# Patient Record
Sex: FEMALE | Race: WHITE
[De-identification: names, ages, dates, MRNs, and addresses within clinical notes are randomized per-mention and may not be internally consistent; named-entity substitution may affect disease eponyms.]

---

## 2021-01-20 ENCOUNTER — HOSPITAL ENCOUNTER (OUTPATIENT)
Dept: HOSPITAL 47 - PNWHC3 | Age: 66
Discharge: HOME | End: 2021-01-20
Attending: ANESTHESIOLOGY
Payer: MEDICARE

## 2021-01-20 VITALS
RESPIRATION RATE: 16 BRPM | HEART RATE: 89 BPM | TEMPERATURE: 98.3 F | DIASTOLIC BLOOD PRESSURE: 83 MMHG | SYSTOLIC BLOOD PRESSURE: 134 MMHG

## 2021-01-20 DIAGNOSIS — Z88.1: ICD-10-CM

## 2021-01-20 DIAGNOSIS — Z79.890: ICD-10-CM

## 2021-01-20 DIAGNOSIS — I10: ICD-10-CM

## 2021-01-20 DIAGNOSIS — Z88.8: ICD-10-CM

## 2021-01-20 DIAGNOSIS — Z79.899: ICD-10-CM

## 2021-01-20 DIAGNOSIS — Z90.49: ICD-10-CM

## 2021-01-20 DIAGNOSIS — E78.5: ICD-10-CM

## 2021-01-20 DIAGNOSIS — M50.30: Primary | ICD-10-CM

## 2021-01-20 DIAGNOSIS — Z79.82: ICD-10-CM

## 2021-01-20 DIAGNOSIS — E07.9: ICD-10-CM

## 2021-01-20 DIAGNOSIS — M47.816: ICD-10-CM

## 2021-01-20 DIAGNOSIS — Z91.09: ICD-10-CM

## 2021-01-20 DIAGNOSIS — M47.812: ICD-10-CM

## 2021-01-20 DIAGNOSIS — M51.36: ICD-10-CM

## 2021-01-20 PROCEDURE — 99211 OFF/OP EST MAY X REQ PHY/QHP: CPT

## 2021-01-20 NOTE — P.PAINCN
History of Present Illness





- Reason for Consult


Consult date: 01/20/21





- History of Present Illness


This is an initial consultation visit for this 65 years old female with a 

chronic history of neck pain and low back pain, she was treated previously , 

[facility], and she had been diagnosed with cervical 

degenerative disc disease cervical facet arthropathy and also she had lumbar 

degenerative disc disease and lumbar spondylosis with lumbar facet arthropathy, 

patient had the RFA of the medial branch lumbar area done at the [facility], and the last procedure was done in June 2020 and she had good 

pain relief, currently her main problem is low back pain, with radiation to the 

buttock area bilaterally, she denies any motor or sensory deficit she denies any

fever or night sweats and there is no change in the bowel movement or urination





Past Medical History


Past Medical History: Hyperlipidemia, Hypertension, Osteoarthritis (OA), Thyroid

Disorder


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

hx of raynaud's, states possible connective tissue disorder, osteopenia, chronic

back pain


History of Any Multi-Drug Resistant Organisms: None Reported


Past Surgical History: Cholecystectomy


Additional Past Surgical History / Comment(s): epidurals for pain, ablations for

pain


Past Anesthesia/Blood Transfusion Reactions: No Reported Reaction


Past Psychological History: No Psychological Hx Reported


Smoking Status: Former smoker


Past Alcohol Use History: None Reported


Past Drug Use History: None Reported





Medications and Allergies


                                Home Medications











 Medication  Instructions  Recorded  Confirmed  Type


 


Aspirin [Adult Low Dose Aspirin EC] 81 mg PO DAILY 01/18/21 01/18/21 History


 


Atorvastatin [Lipitor] 20 mg PO DAILY 01/18/21 01/18/21 History


 


Calcium 600-Vitamin D3 1 tab PO BID 01/18/21  History


 


Cranberry     1 tab PO DAILY 01/18/21  History


 


Furosemide [Lasix] 40 mg PO DAILY 01/18/21 01/18/21 History


 


L.acidoph,Paracasei, B.lactis 1 each PO DAILY 01/18/21 01/18/21 History





[Probiotic]    


 


Levothyroxine Sodium [Synthroid] 50 mcg PO DAILY 01/18/21 01/18/21 History


 


Nitroglycerin Sl Tabs [Nitrostat] 0.4 mg SUBLINGUAL Q5M PRN 01/18/21 01/18/21 

History


 


Potassium Chloride 10 meq PO DAILY 01/18/21 01/18/21 History


 


Vitamin B Complex 1 each PO DAILY 01/18/21 01/18/21 History


 


Vitamin D3     10,000 units PO DAILY 01/18/21  History


 


amLODIPine [Norvasc] 5 mg PO QAM 01/18/21 01/18/21 History








                                    Allergies











Allergy/AdvReac Type Severity Reaction Status Date / Time


 


diltiazem [From Cardizem] Allergy  Rash/Hives Verified 01/18/21 15:05


 


Iodinated Contrast Media Allergy  Itching Verified 01/18/21 15:05


 


levofloxacin [From Levaquin] Allergy  Unknown Verified 01/18/21 15:05


 


meloxicam [From Mobic] Allergy  Swelling Verified 01/18/21 15:05


 


pregabalin [From Lyrica] Allergy  Swelling Verified 01/18/21 15:05


 


sulfamethoxazole Allergy  Rash/Hives Verified 01/18/21 15:05





[From Bactrim]     


 


trimethoprim [From Bactrim] Allergy  Rash/Hives Verified 01/18/21 15:05


 


cefazolin [From Ancef] AdvReac  "made me Verified 01/18/21 15:02





   lightheaded"  


 


ciprofloxacin [From Cipro] AdvReac  "chest Verified 01/18/21 15:03





   pain"  














Physical Exam


Vitals: 


                                   Vital Signs











  Temp Pulse Resp BP Pulse Ox


 


 01/20/21 08:54  98.3 F  89  16  134/83  96











    


   Physical Examinations  :


    -Constitutiona       : Cooperative , not in acute distress .


    -HEENT                :  nech :  supple ,  no Lymphadenopathy  , normal  

thyroid  size .


                               :   eyes  :  no ptosis , no icterus,  no 

photophobia .                                                                   

                                                                                

                                                                                

                                                                                

                                                               


    - neurologic         :   Cranial nerve II   to  XII  intact ,  no   focal 

neurological deffecit  .


    -psychatric          : alert ,  oriented  X 3  ,   appropriate affect   , 

intact judgment  and insight  .  


    -Lymphatic          :    no Lymphadenopathy .


   - musculoskeltal   :     


                                 Cervical Spine 


                                         motor  stregnth in the deltoid and 

biceps,   normal   right side    ,  normal  Left side


                                         motor  stregnth biceps and the wrist 

extensors   normal right side ,normal left side .


                                         motor stregnth in the triceps muscle . 

normal  Right side , normal  Left side  


                                         deep tendon reflexes  normal   at the 

biceps ,   normal  at Brachioradialis , normal  at triceps.


                                         cervical facet loading test: Positive 

Bilaterally


                                         Spurling test= positive Right , 

positive left.


                                         Neck distraction test= positive Right ,

positive left.


                                         Lorena sign= positive right, positive

left .


                                         Limited neck movement


                                   Lumber spine


                                         moter stegnth lower extremities ,thigh 

and legs  5/5 Right side ,  5/5  Left side 


                                         deep tendon reflexes :   normal  Knee 

Jerk    , normal   ankle Jerk  


                                         lumber facet Loading Test =positive 

Right , posiutive Left 


                                         Range of motion of the lumbar spine  

Flexion  30 degrees,   extension   10 degrees


                                         strait leg raising test = positive at  

30  degree   


                                          Fabere test= positive Right ,    and  

positive  LT .


                                          Sever tenderness over the  Sacroiliac 

joint  on the Right , and Left  sides   


                                          Gaenslen  test= positive right ,and 

positive left .


                                          Seated flexion test= positive right 

,and positive Left .





Results


Comments: 


MRI of the cervical spine C3 4 C4 5 C5 6 C6 7 cervical degenerative disc disease

and cervical spondylosis with cervical facet arthropathy.


MRI of the lumbar spine multilevel lumbar degenerative disc disease and lumbar 

spondylosis with facet arthropathy





Assessment and Plan


Plan: 


Assessment and plan=1-lumbar degenerative disc disease


                               2-lumbar spondylosis with lumbar facet 

arthropathy without myelopathy.


                               3-cervical degenerative disc disease.


                               4-cervical spondylosis with cervical facet 

arthropathy.


                                The patient had the diagnostic medial branch 

block lumbar area and also she had RFA of the medial branch lumbar area done at 

[facility] and she had excellent pain relief, she would be good 

candidate to repeat RFA of the medial branch lumbar area at L3, L4, L5


Bilaterally, procedure risk and benefits and alternatives discussed with the 

patient she agreed with proceeding


Time with Patient: Greater than 30





PQRS Measure Charge Sheet


Measure #130: Documentation of Current Meds in Medical Chart: Patient's 

medications documented in chart


Measure #226: Tobacco Use: Screen & Cessation Intervention: Pt not a tobacco 

user


Measure #111: Pneumonia Vaccination: Pneumococcal vaccine administered or 

previously received


Measure #47: Advance Care Plan: Advance care planning discussed & documented, pt

chose/unable to give


Measure #412: Opioid Treatment Agreement: No documentation of signed opioid 

treatment agreement


Measure #408: Opioid Therapy Follow-up Evaluation: Patient had NO f/u eval 

minimum every 3 months during opioid therapy


Measure #317: Preventitive Care & Scrn High Bld Press & F/U: Normal blood 

pressure, f/u not required


Measure #128: Body Mass Index (BMI) Screening & Follow-up: BMI documented ABOVE 

normal parameters - f/u documented


Measure #131: Pain Assessment & Follow-up: Pain positive & plan documented, 

Follow-up scheduled


Measure #431: Unhealthy Alcohol Use Preventative Care & Scrn: Patient not 

identified as an unhealthy alcohol user


PQRS Narrative: 


                                        





Blood Pressure                   134/83


Pain Intensity [Back]            6


Scale Used                       Numeric (1 - 10)


Hx Alcohol Use (MH)              No








Home Medications: 


Ambulatory Orders





Aspirin [Adult Low Dose Aspirin EC] 81 mg PO DAILY 01/18/21 


Atorvastatin [Lipitor] 20 mg PO DAILY 01/18/21 


Calcium 600-Vitamin D3 1 tab PO BID 01/18/21 


Cranberry     1 tab PO DAILY 01/18/21 


Furosemide [Lasix] 40 mg PO DAILY 01/18/21 


L.acidoph,Paracasei, B.lactis [Probiotic] 1 each PO DAILY 01/18/21 


Levothyroxine Sodium [Synthroid] 50 mcg PO DAILY 01/18/21 


Nitroglycerin Sl Tabs [Nitrostat] 0.4 mg SUBLINGUAL Q5M PRN 01/18/21 


Potassium Chloride 10 meq PO DAILY 01/18/21 


Vitamin B Complex 1 each PO DAILY 01/18/21 


Vitamin D3     10,000 units PO DAILY 01/18/21 


amLODIPine [Norvasc] 5 mg PO QAM 01/18/21

## 2021-02-12 ENCOUNTER — HOSPITAL ENCOUNTER (OUTPATIENT)
Dept: HOSPITAL 47 - ORPAIN | Age: 66
Discharge: HOME | End: 2021-02-12
Attending: ANESTHESIOLOGY
Payer: MEDICARE

## 2021-02-12 VITALS — RESPIRATION RATE: 18 BRPM | TEMPERATURE: 97.4 F

## 2021-02-12 VITALS — BODY MASS INDEX: 27.9 KG/M2

## 2021-02-12 VITALS — SYSTOLIC BLOOD PRESSURE: 115 MMHG | DIASTOLIC BLOOD PRESSURE: 72 MMHG | HEART RATE: 66 BPM

## 2021-02-12 DIAGNOSIS — Z90.710: ICD-10-CM

## 2021-02-12 DIAGNOSIS — M47.816: Primary | ICD-10-CM

## 2021-02-12 DIAGNOSIS — E78.5: ICD-10-CM

## 2021-02-12 DIAGNOSIS — Z79.890: ICD-10-CM

## 2021-02-12 DIAGNOSIS — Z79.82: ICD-10-CM

## 2021-02-12 DIAGNOSIS — E07.9: ICD-10-CM

## 2021-02-12 DIAGNOSIS — I10: ICD-10-CM

## 2021-02-12 DIAGNOSIS — Z79.1: ICD-10-CM

## 2021-02-12 DIAGNOSIS — M19.90: ICD-10-CM

## 2021-02-12 PROCEDURE — 64635 DESTROY LUMB/SAC FACET JNT: CPT

## 2021-02-12 PROCEDURE — 64636 DESTROY L/S FACET JNT ADDL: CPT

## 2021-02-12 RX ADMIN — POTASSIUM CHLORIDE SCH MLS: 14.9 INJECTION, SOLUTION INTRAVENOUS at 08:25

## 2021-02-12 RX ADMIN — POTASSIUM CHLORIDE SCH MLS: 14.9 INJECTION, SOLUTION INTRAVENOUS at 09:07

## 2021-02-12 NOTE — P.PCN
Date of Procedure: 02/12/21


Surgeon: Vanesa Little


Pathology: none sent


Condition: stable


Disposition: PACU


Description of Procedure: 


PREOPERATIVE DIAGNOSIS: Lumbar spondylosis without myelopathy


POSTOPERATIVE DIAGNOSIS: Lumbar spondylosis without myelopathy


PROCEDURES : Bilateral Radiofrequency thermocoagulation L3-L4, L4-L5, and L5-S1 

medial branch, with fluoroscopic guidance





ANESTHESIA: Lidocaine 1% for local anesthesia and IV moderate conscious sedation

by the anesthesia department 





Physician:Vanesa Little MD





EBL: Minimal





PROCEDURE INDICATION: The patient with low back pain secondary to lumbar facet  

arthropathy who had more than 50% relief of her pain with previous diagnostic 

lumbar medial branch block with bupivacaine. 





PROCEDURE DESCRIPTION / TECHNIQUE: The patient was seen and identified in the 

preoperative area. Risks, benefits, complications, including but not limited to 

risk of infection ,bleeding , allergic reactions to the medications and no 

complete pain relief , and alternatives were discussed with the patient, the 

patient agreed to proceed with the procedure and signed the consent. IV was 

started. Vital signs remained stable throughout the procedure.





Patient was taken to the OR and time out was completed. The patient was placed 

in the prone position on the procedure table. The lumber  area was prepped and 

draped in the usual sterile fashion. . Vital signs were closely monitored during

the procedure .IV sedation was used during the procedure to decrease patients 

anxiety. 





The target points were identified as follows: For the L5-S1 level which 

corresponds to the dorsal ramus of L5 the target point was at the superior 

medial aspect of the sacral ala on the right side of the spine on the AP view of

fluoroscopy and for the  L3, and L4 medial branches the target points were at 

the connection between the transverse process and the superior articular process

of  L4, and L5 vertebra respectively on the right oblique view of fluoroscopy. 

skin was marked, and localized with 1% lidocaineat these points.  Subsequently, 

an 18  [id number] radiofrequency needles with a 10-mm curved  active tips were 

advanced guided by fluoroscopy to each of the target points mentioned above in a

superior medial direction to get the active tips as parallel as possible to the 

medial branches tracks.  AP, oblique, and lateral views of fluoroscopy were used

to verify needle tips position.  Each level then underwent  motor testing at 2.5

 Hz and 0 to 3 volt with local stimulation, but no radicular symptoms down the 

legs.  I then injected 1 mL of lidocaine 1% in each needle before starting   

radiofrequency thermocoagulation  at 80 degrees celsius for 90 seconds.  After 

that I injected  1 ml of PF Marcaine 0.5%(5 mls) with 40 mg of Kenalog, 1 mL of 

this mixture was given in each needle before taking the needles out intact.  The

left side was done in the same manner.                          





At the end of the procedure, the skin was cleansed and bandages were applied.  A

copy of needle placement fluoroscopy was saved on the C-arm machine.





COMPLICATIONS: No acute complications.





DISPOSITION / PLANS: The patient was placed in a supine position and  

transferred to the recovery area in a stable condition for observation  and was 

discharged from the recovery room after meeting discharge criteria. Home 

discharge instructions given to the patient by the staff. The patient was 

reexamined prior to discharge. The patient will schedule a follow up in the 

clinic in 2-4 weeks.

## 2021-02-12 NOTE — FL
EXAMINATION TYPE: FL guided pain mgmt statistic

 

DATE OF EXAM: 2/12/2021

 

HISTORY: Fluoroscopy  time

 

40 seconds of fluoroscopy provided. 

 

IMPRESSION:

1. Fluoroscopy time.

## 2021-03-24 ENCOUNTER — HOSPITAL ENCOUNTER (OUTPATIENT)
Dept: HOSPITAL 47 - PNWHC3 | Age: 66
End: 2021-03-24
Attending: ANESTHESIOLOGY
Payer: MEDICARE

## 2021-03-24 VITALS
SYSTOLIC BLOOD PRESSURE: 136 MMHG | HEART RATE: 76 BPM | RESPIRATION RATE: 16 BRPM | TEMPERATURE: 98.4 F | DIASTOLIC BLOOD PRESSURE: 84 MMHG

## 2021-03-24 DIAGNOSIS — M47.812: ICD-10-CM

## 2021-03-24 DIAGNOSIS — M51.16: ICD-10-CM

## 2021-03-24 DIAGNOSIS — M48.061: ICD-10-CM

## 2021-03-24 DIAGNOSIS — M47.26: Primary | ICD-10-CM

## 2021-03-24 PROCEDURE — 99211 OFF/OP EST MAY X REQ PHY/QHP: CPT

## 2021-03-24 NOTE — P.PN
Subjective


Progress Note Date: 03/24/21


This is a follow-up visit for this 65 years old female with a chronic history of

severe low back pain and neck pain, she is diagnosed with cervical spondylosis 

and lumbar spondylosis, and lumbar spinal stenosis and lumbar degenerative disc 

disease, recently we did RFA of the medial branch lumbar area which helped her 

axial back pain, currently she is having back pain with radiation to the lower 

extremity bilaterally more prominent on the left side, associated with some 

numbness and tingling sensation, he denies any motor or sensory deficit she 

denies any change in the bowel movement or urination, no fever or night sweats





Objective





- Vital Signs


Vital signs: 


                                   Vital Signs











Temp  98.4 F   03/24/21 12:18


 


Pulse  76   03/24/21 12:18


 


Resp  16   03/24/21 12:18


 


BP  136/84   03/24/21 12:18


 


Pulse Ox  95   03/24/21 12:18














- Exam


    


   Physical Examinations  :


    -Constitutiona       : Cooperative , not in acute distress .


    -HEENT                :  nech :  supple ,  no Lymphadenopathy  , normal  

thyroid  size .


                               :   eyes  :  no ptosis , no icterus,  no 

photophobia .                                                                   

                                                                                

                                                                                

                                                                                

                                                               


    - neurologic         :   Cranial nerve II   to  XII  intact ,  no   focal 

neurological deffecit  .


    -psychatric          : alert ,  oriented  X 3  ,   appropriate affect   , 

intact judgment  and insight  .  


    -Lymphatic          :    no Lymphadenopathy .


   - musculoskeltal   :     


                                   Lumber spine


                                         moter stegnth lower extremities ,thigh 

and legs  5/5 Right side ,  5/5  Left side 


                                         deep tendon reflexes :   normal  Knee 

Jerk    , normal   ankle Jerk  


                                         lumber facet Loading Test = positive


                                         Range of motion of the lumbar spine  

Flexion  30 degrees,   extension   10 degrees


                                         strait leg raising test = positive at  

45 degree   


                                          Fabere test= positive Right ,    and  

positive  LT .


                                           tenderness over the  Sacroiliac joint

 on the Right , and Left  sides  





MRI of the lumbar spine= L4 5 and L5-S1 lumbar spinal stenosis, multilevel 

degenerative disc disease multilevel lumbar facet arthropathy 


                                  





Assessment and Plan


Plan: 


Assessment and plan=1-lumbar radiculopathy.


                               2-lumbar spinal stenosis.


                               3-lumbar spondylosis with lumbar facet 

arthropathy without myelopathy.


                               4-lumbar degenerative disc disease.


                               5-cervical spondylosis.


                                Patient had good results after the RFA of the 

medial branch lumbar area, currently she is having radicular symptoms


                                Patient could benefit from lumbar epidural 

steroid injection under fluoroscopy guidance at L4 5 or L5-S1





- PQRS measures  =


        - Patient's medications are documented in the chart.


         -Tobacco use is negative and counseling.Given.


         -Patient's has not received pneumococcal vaccine.


         -Advanced care planning discussed, patient not eligible.


         -Opiate contract not signed.


         -Pain positive and follow-up visit/procedure is scheduled.


         -Patient's blood pressure measured [ 136/84 ]  , and documented in the 

record ,and patient will follow up with the primary care.


         -Patient's weight was measured and body mass index [  ] above the  

normal limits and counseling was done.  and patient instructed to follow-up with

the primary care physician.


         -Patient was not identified as an unhealthy alcohol user


                                                  


 





Time with Patient: Less than 30